# Patient Record
Sex: FEMALE | Race: WHITE | Employment: OTHER | URBAN - NONMETROPOLITAN AREA
[De-identification: names, ages, dates, MRNs, and addresses within clinical notes are randomized per-mention and may not be internally consistent; named-entity substitution may affect disease eponyms.]

---

## 2021-05-26 ENCOUNTER — HOSPITAL ENCOUNTER (OUTPATIENT)
Dept: MRI IMAGING | Age: 76
Discharge: HOME | End: 2021-05-26
Payer: MEDICARE

## 2021-05-26 DIAGNOSIS — S94.01XA: ICD-10-CM

## 2021-05-26 PROCEDURE — 73718 MRI LOWER EXTREMITY W/O DYE: CPT

## 2023-07-12 ENCOUNTER — HOSPITAL ENCOUNTER (EMERGENCY)
Age: 78
Discharge: ANOTHER HOSPITAL | End: 2023-07-12
Payer: MEDICARE

## 2023-07-12 VITALS
RESPIRATION RATE: 18 BRPM | BODY MASS INDEX: 31.58 KG/M2 | HEIGHT: 64 IN | HEART RATE: 62 BPM | SYSTOLIC BLOOD PRESSURE: 187 MMHG | OXYGEN SATURATION: 98 % | DIASTOLIC BLOOD PRESSURE: 81 MMHG | WEIGHT: 185 LBS | TEMPERATURE: 98.7 F

## 2023-07-12 DIAGNOSIS — S09.90XA INJURY OF HEAD, INITIAL ENCOUNTER: Primary | ICD-10-CM

## 2023-07-12 PROCEDURE — 99204 OFFICE O/P NEW MOD 45 MIN: CPT

## 2023-07-12 PROCEDURE — 99215 OFFICE O/P EST HI 40 MIN: CPT

## 2023-07-12 ASSESSMENT — PAIN SCALES - GENERAL: PAINLEVEL_OUTOF10: 1

## 2023-07-12 ASSESSMENT — PAIN DESCRIPTION - DESCRIPTORS: DESCRIPTORS: SORE

## 2023-07-12 ASSESSMENT — PAIN DESCRIPTION - LOCATION: LOCATION: HEAD

## 2023-07-12 ASSESSMENT — PAIN DESCRIPTION - PAIN TYPE: TYPE: ACUTE PAIN

## 2023-07-12 ASSESSMENT — PAIN - FUNCTIONAL ASSESSMENT
PAIN_FUNCTIONAL_ASSESSMENT: ACTIVITIES ARE NOT PREVENTED
PAIN_FUNCTIONAL_ASSESSMENT: 0-10

## 2023-07-12 ASSESSMENT — PAIN DESCRIPTION - FREQUENCY: FREQUENCY: INTERMITTENT

## 2023-07-12 ASSESSMENT — PAIN DESCRIPTION - ORIENTATION: ORIENTATION: LEFT

## 2023-07-12 ASSESSMENT — PAIN DESCRIPTION - ONSET: ONSET: GRADUAL

## 2023-07-12 NOTE — ED NOTES
Pt to [facility] ED via private car in stable condition with family.      Angela Oates RN  07/12/23 [id number]

## 2023-07-12 NOTE — ED TRIAGE NOTES
Pt to [facility] ambulatory with a laceration to her forehead. This happened today. Pt had no LOC. Pt has a headache. Pt is alert and oriented x 3.

## 2023-07-12 NOTE — ED NOTES
Report called to Dr. Su Bowie at [facility] ED by COLTON Arango CNP.      Odalys Whitman RN  07/12/23 [id number]